# Patient Record
Sex: FEMALE | Race: WHITE
[De-identification: names, ages, dates, MRNs, and addresses within clinical notes are randomized per-mention and may not be internally consistent; named-entity substitution may affect disease eponyms.]

---

## 2018-03-30 ENCOUNTER — HOSPITAL ENCOUNTER (EMERGENCY)
Dept: HOSPITAL 47 - EC | Age: 53
Discharge: HOME | End: 2018-03-30
Payer: COMMERCIAL

## 2018-03-30 VITALS
DIASTOLIC BLOOD PRESSURE: 57 MMHG | TEMPERATURE: 98.3 F | HEART RATE: 61 BPM | SYSTOLIC BLOOD PRESSURE: 110 MMHG | RESPIRATION RATE: 15 BRPM

## 2018-03-30 DIAGNOSIS — Z91.048: ICD-10-CM

## 2018-03-30 DIAGNOSIS — S80.11XA: Primary | ICD-10-CM

## 2018-03-30 DIAGNOSIS — Z95.9: ICD-10-CM

## 2018-03-30 DIAGNOSIS — I25.2: ICD-10-CM
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
## 2018-03-30 DIAGNOSIS — Z91.040: ICD-10-CM
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
## 2018-03-30 PROCEDURE — 99284 EMERGENCY DEPT VISIT MOD MDM: CPT

## 2018-03-30 NOTE — US
EXAMINATION TYPE: US venous doppler duplex LE RT

 

DATE OF EXAM: 3/30/2018 12:22 PM

 

COMPARISON: NONE

 

CLINICAL HISTORY: Pain. Patient went for a run about 5 days ago, felt like she tore a muscle in her r
ight calf. She then traveled in an RV for 4 days from [place] to [place]. Bruising and pain in the r
ight calf today. 

 

SIDE PERFORMED: Right   

 

TECHNIQUE:  The lower extremity deep venous system is examined utilizing real time linear array sonog
linda with graded compression, doppler sonography and color-flow sonography.

 

VESSELS IMAGED:

External Iliac Vein (EIV)

Common Femoral Vein

Deep Femoral Vein

Greater Saphenous Vein *

Femoral Vein

Popliteal Vein

Small Saphenous Vein *

Proximal Calf Veins

(* superficial vessels)

 

 

 

Right Leg:  Negative for DVT.

In the area of the right calf bruising, there is a hypoechoic area visualized measuring 0.6 x 0.3 x 0
.4 cm   

 

 

 

Grayscale, color doppler, spectral doppler imaging performed of the deep veins of the right lower ext
remity.  There is normal flow, compressibility, vascular waveforms.

 

IMPRESSION:    No ultrasound evidence for acute DVT in the right lower extremity. There is 6 mm oval 
focus in the anterior aspect of the right calf muscle at area of bruising could reflect tiny resolvin
g hematoma.

## 2018-03-30 NOTE — ED
General Adult HPI





- General


Chief complaint: Extremity Injury, Lower


Stated complaint: POSS BLOOD CLOT RT LEG


Time Seen by Provider: 03/30/18 11:05


Source: patient, RN notes reviewed, old records reviewed


Mode of arrival: wheelchair


Limitations: no limitations





- History of Present Illness


Initial comments: 





This is a 53-year-old female to the ER for evaluation.  The patient comes in 

for evaluation of right calf pain right leg pain.  Patient's medical history is 

calm.  Regarding this event.  She states she was at a wedding last week and 

during that time she was doing some festivities running and she fell and she 

had pain in her leg.  Since then patient did take about a 4 day trip cross-

country back: She was in [place] when this happened.  Patient states increased 

swelling and increased pain in that right lower extremity.  Patient is able to 

bear weight denies any bony pain, no history of blood clots.  Patient was seen 

by family doctor and sent to ER for evaluation regarding possible blood clot.  

Patient does state that she has full range of motion of ankle





- Related Data


 Home Medications











 Medication  Instructions  Recorded  Confirmed


 


Multivitamin/Iron/Folic Acid 1 tab PO HS 04/12/15 03/30/18





[Centrum Complete Multivit Tab]   











 Allergies











Allergy/AdvReac Type Severity Reaction Status Date / Time


 


adhesive tape Allergy  Rash/Hives Verified 03/30/18 11:14


 


latex AdvReac  Rash/Hives Verified 03/30/18 11:14














Review of Systems


ROS Statement: 


Those systems with pertinent positive or pertinent negative responses have been 

documented in the HPI.





ROS Other: All systems not noted in ROS Statement are negative.





Past Medical History


Past Medical History: Asthma, Myocardial Infarction (MI)


Additional Past Medical History / Comment(s): see Dr Wang H&P


Last Myocardial Infarction Date:: 4-15-15


History of Any Multi-Drug Resistant Organisms: None Reported


Past Surgical History: Cardiac Ablation, Heart Catheterization, Hysterectomy, 

Tubal Ligation


Additional Past Surgical History / Comment(s): laparoscopy


Past Anesthesia/Blood Transfusion Reactions: Previous Problems w/ Anesthesia, 

Motion Sickness, Postoperative Nausea & Vomiting (PONV)


Additional Past Anesthesia/Blood Transfusion Reaction / Comment(s): diff waking 

up


Past Psychological History: No Psychological Hx Reported


Smoking Status: Never smoker


Past Alcohol Use History: Occasional


Past Drug Use History: None Reported





- Past Family History


  ** Mother


Family Medical History: Skin Disorder


Additional Family Medical History / Comment(s): shingles





  ** Father


Family Medical History: Cancer


Additional Family Medical History / Comment(s): lung cancer





General Exam


Limitations: no limitations


General appearance: alert, in no apparent distress


Head exam: Present: atraumatic, normocephalic, normal inspection


Eye exam: Present: normal appearance, PERRL, EOMI.  Absent: scleral icterus, 

conjunctival injection, periorbital swelling


ENT exam: Present: normal exam, mucous membranes moist


Neck exam: Present: normal inspection.  Absent: tenderness, meningismus, 

lymphadenopathy


Respiratory exam: Present: normal lung sounds bilaterally.  Absent: respiratory 

distress, wheezes, rales, rhonchi, stridor


Cardiovascular Exam: Present: regular rate, normal rhythm, normal heart sounds.

  Absent: systolic murmur, diastolic murmur, rubs, gallop, clicks


GI/Abdominal exam: Present: soft, normal bowel sounds.  Absent: distended, 

tenderness, guarding, rebound, rigid


Extremities exam: Present: normal inspection, full ROM, normal capillary 

refill.  Absent: tenderness, pedal edema, joint swelling, calf tenderness


Back exam: Present: normal inspection


Neurological exam: Present: alert, oriented X3, CN II-XII intact


Psychiatric exam: Present: normal affect, normal mood


Skin exam: Present: warm, dry, intact, normal color.  Absent: rash





Course


 Vital Signs











  03/30/18





  11:01


 


Temperature 98.1 F


 


Pulse Rate 88


 


Respiratory 18





Rate 


 


Blood Pressure 131/73


 


O2 Sat by Pulse 100





Oximetry 














Medical Decision Making





- Medical Decision Making





53 female the ER with right leg pain, positive hematoma, no DVT.  No erythema 

to suggest abscess.  Patient will be discharged home





- Radiology Data


Radiology results: report reviewed (NEGATIVE FOR DVT LIKELY HEMATOMA), image 

reviewed





Disposition


Clinical Impression: 


 Hematoma of right lower extremity





Disposition: HOME SELF-CARE


Condition: Good


Instructions:  Hematoma (ED)


Referrals: 


Elina Johnson MD [Primary Care Provider] - 1-2 days